# Patient Record
Sex: MALE | Race: WHITE | NOT HISPANIC OR LATINO | Employment: FULL TIME | URBAN - METROPOLITAN AREA
[De-identification: names, ages, dates, MRNs, and addresses within clinical notes are randomized per-mention and may not be internally consistent; named-entity substitution may affect disease eponyms.]

---

## 2020-05-20 ENCOUNTER — OFFICE VISIT (OUTPATIENT)
Dept: PULMONOLOGY | Facility: CLINIC | Age: 60
End: 2020-05-20
Payer: OTHER GOVERNMENT

## 2020-05-20 ENCOUNTER — HOSPITAL ENCOUNTER (OUTPATIENT)
Dept: RADIOLOGY | Facility: HOSPITAL | Age: 60
Discharge: HOME | End: 2020-05-20
Attending: NURSE PRACTITIONER
Payer: OTHER GOVERNMENT

## 2020-05-20 VITALS
OXYGEN SATURATION: 97 % | DIASTOLIC BLOOD PRESSURE: 82 MMHG | BODY MASS INDEX: 23.69 KG/M2 | WEIGHT: 156.31 LBS | HEIGHT: 68 IN | SYSTOLIC BLOOD PRESSURE: 140 MMHG | HEART RATE: 83 BPM

## 2020-05-20 DIAGNOSIS — R06.02 SHORTNESS OF BREATH: Primary | ICD-10-CM

## 2020-05-20 DIAGNOSIS — J43.2 CENTRILOBULAR EMPHYSEMA: ICD-10-CM

## 2020-05-20 DIAGNOSIS — J45.20 MILD INTERMITTENT ASTHMA WITHOUT COMPLICATION: ICD-10-CM

## 2020-05-20 DIAGNOSIS — R91.1 LUNG NODULE: ICD-10-CM

## 2020-05-20 PROCEDURE — 99999 PR PBB SHADOW E&M-NEW PATIENT-LVL III: CPT | Mod: PBBFAC,,, | Performed by: NURSE PRACTITIONER

## 2020-05-20 PROCEDURE — 71250 CT THORAX DX C-: CPT | Mod: 26,,, | Performed by: RADIOLOGY

## 2020-05-20 PROCEDURE — 99999 PR PBB SHADOW E&M-NEW PATIENT-LVL III: ICD-10-PCS | Mod: PBBFAC,,, | Performed by: NURSE PRACTITIONER

## 2020-05-20 PROCEDURE — 71250 CT CHEST WITHOUT CONTRAST: ICD-10-PCS | Mod: 26,,, | Performed by: RADIOLOGY

## 2020-05-20 PROCEDURE — 71250 CT THORAX DX C-: CPT | Mod: TC

## 2020-05-20 PROCEDURE — 99204 PR OFFICE/OUTPT VISIT, NEW, LEVL IV, 45-59 MIN: ICD-10-PCS | Mod: S$GLB,,, | Performed by: NURSE PRACTITIONER

## 2020-05-20 PROCEDURE — 99204 OFFICE O/P NEW MOD 45 MIN: CPT | Mod: S$GLB,,, | Performed by: NURSE PRACTITIONER

## 2020-05-20 RX ORDER — ALLOPURINOL 100 MG/1
TABLET ORAL
COMMUNITY
Start: 2020-03-26

## 2020-05-20 RX ORDER — ALBUTEROL SULFATE 90 UG/1
AEROSOL, METERED RESPIRATORY (INHALATION)
COMMUNITY
Start: 2020-05-07

## 2020-05-20 RX ORDER — DILTIAZEM HYDROCHLORIDE 180 MG/1
CAPSULE, COATED, EXTENDED RELEASE ORAL
COMMUNITY
Start: 2020-05-07

## 2020-05-20 RX ORDER — ALBUTEROL SULFATE 90 UG/1
2 AEROSOL, METERED RESPIRATORY (INHALATION)
COMMUNITY
Start: 2020-05-07 | End: 2020-08-05

## 2020-05-20 RX ORDER — DILTIAZEM HYDROCHLORIDE 180 MG/1
180 CAPSULE, COATED, EXTENDED RELEASE ORAL
COMMUNITY
Start: 2020-05-07 | End: 2021-05-07

## 2020-05-20 RX ORDER — POTASSIUM CITRATE 15 MEQ/1
TABLET, EXTENDED RELEASE ORAL 3 TIMES DAILY
COMMUNITY
Start: 2020-04-13

## 2020-05-20 RX ORDER — POTASSIUM CITRATE 15 MEQ/1
TABLET, EXTENDED RELEASE ORAL
COMMUNITY
Start: 2020-04-13

## 2020-05-20 NOTE — PROGRESS NOTES
Subjective:       Patient ID: Miguel Dumont is a 60 y.o. male.    Chief Complaint: Pulmonary Nodules  [id number]  HPI:   Miguel Dumont is a 60 y.o. male who presents with evaluation for a solitary pulmonary nodule found on LDCT lung screening in November 2019.  Had two CXRs that were suggestive of a nodule.    Put it off for a while, he had a phone visit with Dr. Berg, his nephrologist who encouraged him to establish with pulmonary and likely repeat imaging.        Started smoking at age 14, smokes one pack daily  Has asthma, Used albuterol once in last 2 months  Children have asthma    Review of Systems   Constitutional: Negative for fever, chills, weight loss, activity change, fatigue and night sweats.   HENT: Negative for postnasal drip, rhinorrhea, trouble swallowing and congestion.    Eyes: Negative for itching.   Respiratory: Positive for cough (rare), chest tightness, wheezing and dyspnea on extertion. Negative for hemoptysis, sputum production, choking, shortness of breath and use of rescue inhaler.    Cardiovascular: Negative for chest pain and palpitations.   Genitourinary: Negative for difficulty urinating.   Endocrine: Negative for cold intolerance and heat intolerance.    Musculoskeletal: Negative for arthralgias.   Skin: Negative for rash.   Gastrointestinal: Negative for nausea, vomiting and acid reflux.   Neurological: Negative for dizziness and light-headedness.   Hematological: Negative for adenopathy.   Psychiatric/Behavioral: Negative for sleep disturbance.         Social History     Tobacco Use    Smoking status: Current Every Day Smoker     Packs/day: 1.00     Years: 46.00     Pack years: 46.00     Types: Cigarettes     Start date: 01/1975   Substance Use Topics    Alcohol use: Never     Frequency: Never       Review of patient's allergies indicates:   Allergen Reactions    Eggs [egg derived]      No past medical history on file.  No past surgical history on file.  Current Outpatient  Medications on File Prior to Visit   Medication Sig    albuterol (PROVENTIL/VENTOLIN HFA) 90 mcg/actuation inhaler Inhale 2 puffs into the lungs.    albuterol (PROVENTIL/VENTOLIN HFA) 90 mcg/actuation inhaler TAKE 2 PUFFS BY MOUTH EVERY 6 HOURS AS NEEDED FOR WHEEZE    ALBUTEROL INHL Inhale into the lungs.    allopurinoL (ZYLOPRIM) 100 MG tablet TAKE 1 TABLET BY MOUTH EVERY DAY    allopurinoL (ZYLOPRIM) 100 MG tablet     diltiaZEM (CARDIZEM CD) 180 MG 24 hr capsule Take 180 mg by mouth.    diltiaZEM (CARDIZEM CD) 180 MG 24 hr capsule     potassium citrate (UROCIT-K 15) 15 mEq TbSR TAKE 1 TABLET BY MOUTH THREE TIMES A DAY    potassium citrate (UROCIT-K 15) 15 mEq TbSR      No current facility-administered medications on file prior to visit.        Objective:      Vitals:    05/20/20 1017   BP: (!) 140/82   Pulse: 83     Physical Exam   Constitutional: He is oriented to person, place, and time. He appears well-developed and well-nourished. No distress.   HENT:   Head: Normocephalic.   Neck: Normal range of motion. Neck supple.   Cardiovascular: Normal rate and regular rhythm.   No murmur heard.  Pulmonary/Chest: Normal expansion, symmetric chest wall expansion, effort normal and breath sounds normal. No respiratory distress. He has no decreased breath sounds. He has no wheezes. He has no rhonchi. He has no rales.   Abdominal: Soft. He exhibits no distension. There is no hepatosplenomegaly. There is no tenderness.   Musculoskeletal: Normal range of motion. He exhibits no edema.   Lymphadenopathy:     He has no cervical adenopathy.   Neurological: He is alert and oriented to person, place, and time. Gait normal.   Skin: Skin is warm and dry. No cyanosis. Nails show no clubbing.   Psychiatric: He has a normal mood and affect.   Vitals reviewed.    Personal Diagnostic Review  Outside CT reports reviewed.  Images unavailable.    Pulmonary Function Tests 5/20/2020   SpO2 97   Height 68.000   Weight 2500.9   BMI  (Calculated) 23.8   Some recent data might be hidden         Assessment:     Problem List Items Addressed This Visit        Pulmonary    Lung nodule    Overview     RML 6 mm nodule  Noted in 2017  Repeat CT as recommendation was for 6 month follow up  Right middle lobe has solid 0.6 cm nodule noted on today's images  Nearby is a 4 mm calcification consistent with granuloma         Current Assessment & Plan     As patient is a current daily smoker, will repeat imaging in 6 months.  If nodule remains stable, will return to annual LDCT screening.          Relevant Orders    CT Chest Without Contrast (Completed)    Mild intermittent asthma without complication    Overview     Await PFTs  Continue PRN PIPPA         Current Assessment & Plan     May benefit from daily ICS/LABA- await PFTs         Centrilobular emphysema    Overview     Noted on CT  Current daily smoker  Await PFTs         Current Assessment & Plan     Must stop smoking.  Long discussion.  Patient not ready at this time           Other Visit Diagnoses     Shortness of breath    -  Primary    Relevant Orders    Spirometry with/without bronchodilator    LUNG VOLUMES    DLCO-Carbon Monoxide Diffusing Capacity    COVID-19 Routine Screening

## 2020-05-20 NOTE — PATIENT INSTRUCTIONS
Get your CT scan when you can, I will call you with the results      Get your breathing tests, we will test you for COVID 48 hours prior

## 2020-05-22 ENCOUNTER — TELEPHONE (OUTPATIENT)
Dept: PULMONOLOGY | Facility: CLINIC | Age: 60
End: 2020-05-22

## 2020-05-22 PROBLEM — J43.2 CENTRILOBULAR EMPHYSEMA: Status: ACTIVE | Noted: 2020-05-22

## 2020-05-22 PROBLEM — J45.20 MILD INTERMITTENT ASTHMA WITHOUT COMPLICATION: Status: ACTIVE | Noted: 2020-05-22

## 2020-05-22 PROBLEM — R91.1 LUNG NODULE: Status: ACTIVE | Noted: 2020-05-22

## 2020-05-22 NOTE — ASSESSMENT & PLAN NOTE
As patient is a current daily smoker, will repeat imaging in 6 months.  If nodule remains stable, will return to annual LDCT screening.

## 2021-04-16 ENCOUNTER — PATIENT MESSAGE (OUTPATIENT)
Dept: RESEARCH | Facility: HOSPITAL | Age: 61
End: 2021-04-16